# Patient Record
Sex: FEMALE | Race: WHITE | NOT HISPANIC OR LATINO | Employment: OTHER | URBAN - METROPOLITAN AREA
[De-identification: names, ages, dates, MRNs, and addresses within clinical notes are randomized per-mention and may not be internally consistent; named-entity substitution may affect disease eponyms.]

---

## 2023-04-07 ENCOUNTER — HOSPITAL ENCOUNTER (EMERGENCY)
Facility: CLINIC | Age: 88
Discharge: HOME | End: 2023-04-07
Attending: EMERGENCY MEDICINE | Admitting: EMERGENCY MEDICINE
Payer: COMMERCIAL

## 2023-04-07 ENCOUNTER — APPOINTMENT (OUTPATIENT)
Dept: GENERAL RADIOLOGY | Facility: CLINIC | Age: 88
End: 2023-04-07
Attending: EMERGENCY MEDICINE
Payer: COMMERCIAL

## 2023-04-07 VITALS
OXYGEN SATURATION: 95 % | DIASTOLIC BLOOD PRESSURE: 66 MMHG | RESPIRATION RATE: 18 BRPM | TEMPERATURE: 98 F | HEART RATE: 70 BPM | SYSTOLIC BLOOD PRESSURE: 147 MMHG

## 2023-04-07 DIAGNOSIS — S82.832A CLOSED AVULSION FRACTURE OF DISTAL END OF LEFT FIBULA, INITIAL ENCOUNTER: ICD-10-CM

## 2023-04-07 LAB
ANION GAP SERPL CALCULATED.3IONS-SCNC: 12 MMOL/L (ref 7–15)
BASOPHILS # BLD AUTO: 0.1 10E3/UL (ref 0–0.2)
BASOPHILS NFR BLD AUTO: 1 %
BUN SERPL-MCNC: 15.5 MG/DL (ref 8–23)
CALCIUM SERPL-MCNC: 9.4 MG/DL (ref 8.8–10.2)
CHLORIDE SERPL-SCNC: 95 MMOL/L (ref 98–107)
CREAT SERPL-MCNC: 0.64 MG/DL (ref 0.51–0.95)
CRP SERPL-MCNC: <3 MG/L
DEPRECATED HCO3 PLAS-SCNC: 25 MMOL/L (ref 22–29)
EOSINOPHIL # BLD AUTO: 0.1 10E3/UL (ref 0–0.7)
EOSINOPHIL NFR BLD AUTO: 2 %
ERYTHROCYTE [DISTWIDTH] IN BLOOD BY AUTOMATED COUNT: 13.6 % (ref 10–15)
GFR SERPL CREATININE-BSD FRML MDRD: 85 ML/MIN/1.73M2
GLUCOSE SERPL-MCNC: 86 MG/DL (ref 70–99)
HCT VFR BLD AUTO: 35.7 % (ref 35–47)
HGB BLD-MCNC: 11.6 G/DL (ref 11.7–15.7)
IMM GRANULOCYTES # BLD: 0 10E3/UL
IMM GRANULOCYTES NFR BLD: 0 %
LYMPHOCYTES # BLD AUTO: 0.9 10E3/UL (ref 0.8–5.3)
LYMPHOCYTES NFR BLD AUTO: 13 %
MCH RBC QN AUTO: 28.5 PG (ref 26.5–33)
MCHC RBC AUTO-ENTMCNC: 32.5 G/DL (ref 31.5–36.5)
MCV RBC AUTO: 88 FL (ref 78–100)
MONOCYTES # BLD AUTO: 0.8 10E3/UL (ref 0–1.3)
MONOCYTES NFR BLD AUTO: 11 %
NEUTROPHILS # BLD AUTO: 5.1 10E3/UL (ref 1.6–8.3)
NEUTROPHILS NFR BLD AUTO: 73 %
NRBC # BLD AUTO: 0 10E3/UL
NRBC BLD AUTO-RTO: 0 /100
PLAT MORPH BLD: NORMAL
PLATELET # BLD AUTO: 213 10E3/UL (ref 150–450)
POTASSIUM SERPL-SCNC: 4.5 MMOL/L (ref 3.4–5.3)
RBC # BLD AUTO: 4.07 10E6/UL (ref 3.8–5.2)
RBC MORPH BLD: NORMAL
SODIUM SERPL-SCNC: 132 MMOL/L (ref 136–145)
URATE SERPL-MCNC: 3.6 MG/DL (ref 2.4–5.7)
WBC # BLD AUTO: 7 10E3/UL (ref 4–11)

## 2023-04-07 PROCEDURE — 73610 X-RAY EXAM OF ANKLE: CPT | Mod: LT

## 2023-04-07 PROCEDURE — 85025 COMPLETE CBC W/AUTO DIFF WBC: CPT | Performed by: EMERGENCY MEDICINE

## 2023-04-07 PROCEDURE — 99284 EMERGENCY DEPT VISIT MOD MDM: CPT | Mod: 25

## 2023-04-07 PROCEDURE — 250N000012 HC RX MED GY IP 250 OP 636 PS 637: Performed by: EMERGENCY MEDICINE

## 2023-04-07 PROCEDURE — 86140 C-REACTIVE PROTEIN: CPT | Performed by: EMERGENCY MEDICINE

## 2023-04-07 PROCEDURE — 36415 COLL VENOUS BLD VENIPUNCTURE: CPT | Performed by: EMERGENCY MEDICINE

## 2023-04-07 PROCEDURE — 80048 BASIC METABOLIC PNL TOTAL CA: CPT | Performed by: EMERGENCY MEDICINE

## 2023-04-07 PROCEDURE — 84550 ASSAY OF BLOOD/URIC ACID: CPT | Performed by: EMERGENCY MEDICINE

## 2023-04-07 RX ORDER — DEXAMETHASONE 4 MG/1
4 TABLET ORAL ONCE
Status: COMPLETED | OUTPATIENT
Start: 2023-04-07 | End: 2023-04-07

## 2023-04-07 RX ADMIN — DEXAMETHASONE 4 MG: 4 TABLET ORAL at 16:34

## 2023-04-07 ASSESSMENT — ACTIVITIES OF DAILY LIVING (ADL)
ADLS_ACUITY_SCORE: 35
ADLS_ACUITY_SCORE: 33

## 2023-04-07 ASSESSMENT — ENCOUNTER SYMPTOMS: ARTHRALGIAS: 1

## 2023-04-07 NOTE — ED PROVIDER NOTES
History     Chief Complaint:  Ankle Pain    The history is provided by the patient and a relative. History limited by: dementia.      Kenia Nunez is a 88 year old female with a history of Alzheimer's disease, aortic stenosis, breast cancer, and hyperlipidemia who presents with ankle pain. The patient's family member reports that after picking up the patient from her memory care facility in [place], the patient's left ankle became very swollen. States that the patient has had left ankle pain and was icing her ankle yesterday. Reports that when she was picked up, the patient's ankle was not very swollen and she was ambulatory, but after driving down to the [facility], the swelling has worsened and the patient is no longer able to walk. Notes allergy to sulfa and penicillin. Denies known trauma.     Independent Historian:   Daughter - They report as noted above.    Review of External Notes:     ROS:  Review of Systems   Cardiovascular: Positive for leg swelling.   Musculoskeletal: Positive for arthralgias.   All other systems reviewed and are negative.    Allergies:  Penicillins  Sulfa Drugs     Medications:    Simvastatin  Cetirizine  Fluticasone  Memantine  Donepezil     Past Medical History:    Alzheimer's disease  Mood disorder  Lymphedema of right upper extremity  Amblyopia, right  Aortic stenosis  Malignant neoplasm of breast  Osteoporosis  Hyperlipidemia  Vitamin D deficiency  Tubular adenoma     Past Surgical History:    Radical mastectomy, right  Tonsillectomy and adenoidectomy  Blepharoplasty, bilateral     Family History:    Heart disease  Breast cancer  Ovarian cancer     Social History:  Presents with family member  Presents via private vehicle  Lives in Memory Care facility in [place]    Physical Exam     Patient Vitals for the past 24 hrs:   BP Temp Temp src Pulse Resp SpO2   04/07/23 1544 (!) 147/66 98  F (36.7  C) Temporal 70 18 95 %      Physical Exam  Vitals reviewed.   Cardiovascular:       Rate and Rhythm: Normal rate.   Pulmonary:      Effort: Pulmonary effort is normal.   Abdominal:      General: Abdomen is flat.   Musculoskeletal:      Comments: There is swelling over the lateral malleolus on the left.  No open wound.  No joint effusion.  No erythema.   Skin:     Capillary Refill: Capillary refill takes less than 2 seconds.   Neurological:      Mental Status: She is alert. Mental status is at baseline.         Emergency Department Course   Imaging:  XR Ankle Left G/E 3 Views   Final Result   IMPRESSION: Subtle lucency in the tip of the fibula consistent with an   acute nondisplaced fracture. Lateral soft tissue swelling. No tibial   fractures are evident. The ankle mortise is intact. The talar dome is   unremarkable. Plantar and Achilles calcaneal spurs.       BERTO IZQUIERDO MD            SYSTEM ID:  [id number]         Report per radiology    Laboratory:  Labs Ordered and Resulted from Time of ED Arrival to Time of ED Departure   BASIC METABOLIC PANEL - Abnormal       Result Value    Sodium 132 (*)     Potassium 4.5      Chloride 95 (*)     Carbon Dioxide (CO2) 25      Anion Gap 12      Urea Nitrogen 15.5      Creatinine 0.64      Calcium 9.4      Glucose 86      GFR Estimate 85     CBC WITH PLATELETS AND DIFFERENTIAL - Abnormal    WBC Count 7.0      RBC Count 4.07      Hemoglobin 11.6 (*)     Hematocrit 35.7      MCV 88      MCH 28.5      MCHC 32.5      RDW 13.6      Platelet Count 213      % Neutrophils 73      % Lymphocytes 13      % Monocytes 11      % Eosinophils 2      % Basophils 1      % Immature Granulocytes 0      NRBCs per 100 WBC 0      Absolute Neutrophils 5.1      Absolute Lymphocytes 0.9      Absolute Monocytes 0.8      Absolute Eosinophils 0.1      Absolute Basophils 0.1      Absolute Immature Granulocytes 0.0      Absolute NRBCs 0.0     CRP INFLAMMATION - Normal    CRP Inflammation <3.00     URIC ACID - Normal    Uric Acid 3.6     RBC AND PLATELET MORPHOLOGY    Platelet  Assessment        Value: Automated Count Confirmed. Platelet morphology is normal.    RBC Morphology Confirmed RBC Indices        Emergency Department Course & Assessments:  Interventions:  Medications   dexamethasone (DECADRON) tablet 4 mg (4 mg Oral $Given 4/7/23 1634)      Assessments:  1539 I obtained history and examined the patient as noted above.  1658 I rechecked and updated the patient.    Independent Interpretation (X-rays, CTs, rhythm strip):  None    Consultations/Discussion of Management or Tests:  None     Social Determinants of Health affecting care:   None    Disposition:  The patient was discharged to home.     Impression & Plan    Medical Decision Making:  Patient presents with some ankle swelling without clear history of trauma the patient has dementia.  X-ray performed due to ankle swelling and positive for hairline fracture.  Recommended cam boot and follow-up with orthopedics.  Due to ED boarding and overcrowding patient was seen and discharged at triage    Diagnosis:    ICD-10-CM    1. Closed avulsion fracture of distal end of left fibula, initial encounter  S82.832A            Discharge Medications:  There are no discharge medications for this patient.     Scribe Disclosure:  I, Candy Kirk, am serving as a scribe at 4:58 PM on 4/7/2023 to document services personally performed by Keyon Alamo MD based on my observations and the provider's statements to me.     4/7/2023   Keyon Alamo MD Goodman, Brian Samuel, MD  04/16/23 2066

## 2023-04-07 NOTE — DISCHARGE INSTRUCTIONS
We have found a avulsion fracture of the distal fibula on the left.  Please wear the cam boot all the time except when in the shower.  Please follow-up with an orthopedist to recheck on healing this can be done in a few weeks.  Ice and elevate the ankle for swelling okay to use Tylenol or ibuprofen for pain.  Thanks for your patience today.  Lab work was performed due to unclear history of trauma but there is no signs of lab abnormality.